# Patient Record
Sex: FEMALE | Race: OTHER
[De-identification: names, ages, dates, MRNs, and addresses within clinical notes are randomized per-mention and may not be internally consistent; named-entity substitution may affect disease eponyms.]

---

## 2020-02-02 ENCOUNTER — HOSPITAL ENCOUNTER (EMERGENCY)
Dept: HOSPITAL 54 - ER | Age: 50
Discharge: HOME | End: 2020-02-02
Payer: MEDICAID

## 2020-02-02 VITALS — HEIGHT: 66 IN | BODY MASS INDEX: 28.93 KG/M2 | WEIGHT: 180 LBS

## 2020-02-02 VITALS — DIASTOLIC BLOOD PRESSURE: 84 MMHG | SYSTOLIC BLOOD PRESSURE: 132 MMHG

## 2020-02-02 DIAGNOSIS — J20.9: Primary | ICD-10-CM

## 2020-02-02 DIAGNOSIS — E11.9: ICD-10-CM

## 2020-02-02 NOTE — NUR
BIB SELF C/O COUGH AND CONGESTION, FEVER FOR 5 DAYS, +R EAR PAIN, CHEST AND 
BACK PAIN. TO ER BED 10, HOOKED TO MONITOR. AWAITING FOR EVAL

## 2021-01-07 ENCOUNTER — HOSPITAL ENCOUNTER (EMERGENCY)
Dept: HOSPITAL 54 - ER | Age: 51
Discharge: HOME | End: 2021-01-07
Payer: MEDICAID

## 2021-01-07 VITALS — WEIGHT: 180 LBS | BODY MASS INDEX: 35.34 KG/M2 | HEIGHT: 60 IN

## 2021-01-07 VITALS — SYSTOLIC BLOOD PRESSURE: 156 MMHG | DIASTOLIC BLOOD PRESSURE: 86 MMHG

## 2021-01-07 DIAGNOSIS — E11.9: ICD-10-CM

## 2021-01-07 DIAGNOSIS — Z79.4: ICD-10-CM

## 2021-01-07 DIAGNOSIS — U07.1: Primary | ICD-10-CM

## 2022-06-20 ENCOUNTER — HOSPITAL ENCOUNTER (EMERGENCY)
Dept: HOSPITAL 54 - ER | Age: 52
Discharge: HOME | End: 2022-06-20
Payer: MEDICAID

## 2022-06-20 VITALS — HEIGHT: 64 IN | WEIGHT: 195 LBS | BODY MASS INDEX: 33.29 KG/M2

## 2022-06-20 VITALS — SYSTOLIC BLOOD PRESSURE: 164 MMHG | DIASTOLIC BLOOD PRESSURE: 91 MMHG

## 2022-06-20 DIAGNOSIS — M79.674: Primary | ICD-10-CM

## 2022-06-20 DIAGNOSIS — E11.9: ICD-10-CM

## 2023-12-19 NOTE — NUR
DR GARCIA AT BEDSIDE Encounter addended by: Elsa Silva RN on: 12/19/2023 12:42 PM   Actions taken: Contacts section saved, Flowsheet accepted